# Patient Record
Sex: FEMALE
[De-identification: names, ages, dates, MRNs, and addresses within clinical notes are randomized per-mention and may not be internally consistent; named-entity substitution may affect disease eponyms.]

---

## 2020-04-24 ENCOUNTER — HOSPITAL ENCOUNTER (EMERGENCY)
Dept: HOSPITAL 56 - MW.ED | Age: 40
Discharge: HOME | End: 2020-04-24
Payer: MEDICAID

## 2020-04-24 DIAGNOSIS — Z79.899: ICD-10-CM

## 2020-04-24 DIAGNOSIS — K59.04: Primary | ICD-10-CM

## 2020-04-24 LAB
BUN SERPL-MCNC: 16 MG/DL (ref 7–18)
CHLORIDE SERPL-SCNC: 106 MMOL/L (ref 98–107)
CO2 SERPL-SCNC: 27.9 MMOL/L (ref 21–32)
GLUCOSE SERPL-MCNC: 89 MG/DL (ref 74–106)
LIPASE SERPL-CCNC: 124 U/L (ref 73–393)
POTASSIUM SERPL-SCNC: 3.9 MMOL/L (ref 3.5–5.1)
SODIUM SERPL-SCNC: 140 MMOL/L (ref 136–145)

## 2020-04-24 PROCEDURE — 83605 ASSAY OF LACTIC ACID: CPT

## 2020-04-24 PROCEDURE — 83690 ASSAY OF LIPASE: CPT

## 2020-04-24 PROCEDURE — 80053 COMPREHEN METABOLIC PANEL: CPT

## 2020-04-24 PROCEDURE — 96374 THER/PROPH/DIAG INJ IV PUSH: CPT

## 2020-04-24 PROCEDURE — 36415 COLL VENOUS BLD VENIPUNCTURE: CPT

## 2020-04-24 PROCEDURE — 85025 COMPLETE CBC W/AUTO DIFF WBC: CPT

## 2020-04-24 PROCEDURE — 83735 ASSAY OF MAGNESIUM: CPT

## 2020-04-24 PROCEDURE — 74177 CT ABD & PELVIS W/CONTRAST: CPT

## 2020-04-24 PROCEDURE — 96361 HYDRATE IV INFUSION ADD-ON: CPT

## 2020-04-24 PROCEDURE — 99285 EMERGENCY DEPT VISIT HI MDM: CPT

## 2020-04-24 NOTE — CT
CT abdomen and pelvis

 

Technique: Multiple axial sections were obtained from above the dome 

of the diaphragm inferiorly through the pubic symphysis.  Intravenous 

contrast was utilized.  No oral contrast has been given.

 

Comparison: No prior abdominal imaging.

 

Findings: Visualized lung bases show nothing acute.

 

Liver contains no focal parenchymal abnormality.  Spleen shows no 

focal abnormality.  Adrenal glands show no nodule.  Kidneys show 

symmetric contrast enhancement with no hydronephrosis or mass being 

seen.  Pancreas appears within normal limits.  Gallbladder contains no

 calcified gallstones.  Aorta shows no aneurysm.  No retroperitoneal 

adenopathy or mesenteric abnormalities are seen.  No pelvic mass or 

adenopathy is noted.

 

Increased stool is seen throughout the colon.

 

Appendix is seen which is normal in size.  No free fluid or 

inflammatory change is appreciated.

 

Bone window settings were reviewed.  No acute osseous finding is 

appreciated.

 

Impression:

1.  Increased stool throughout the colon.

2.  Nothing acute is otherwise seen on CT study of the abdomen and 

pelvis.

 

Diagnostic code #2

 

This report was dictated in MDT

## 2020-04-24 NOTE — EDM.PDOC
ED HPI GENERAL MEDICAL PROBLEM





- General


Chief Complaint: Abdominal Pain


Stated Complaint: ABD PAIN


Time Seen by Provider: 04/24/20 13:10


Source of Information: Reports: Patient


History Limitations: Reports: No Limitations





- History of Present Illness


INITIAL COMMENTS - FREE TEXT/NARRATIVE: 





This 39 year old female is admitted to the ED with a chief complaint of 

epigastric abdominal pain going through to the back for two days that has 

gotten worse.  She describes the pain as sharp to dull.  No nausea or vomiting.

  Complains of constipation.  No  complaints.  She denies any other 

complaints.





She has a history of pancreatitis that was diagnosed 4 years ago.  She is on 

Suboxone.  She denies any alcohol ingestion or drug use.


Onset: Gradual (over two days)


Duration: Constant


Location: Reports: Abdomen


Quality: Reports: Sharp (to dull)


  ** Upper Abdomen


Pain Score (Numeric/FACES): 10





- Related Data


 Allergies











Allergy/AdvReac Type Severity Reaction Status Date / Time


 


No Known Allergies Allergy   Verified 04/24/20 13:14











Home Meds: 


 Home Meds





Buprenorphine HCl/Naloxone HCl [Buprenorphin-Naloxon 8-2 mg Sl] 1 tab .XX DAILY 

04/24/20 [History]


Ondansetron [Zofran] 4 mg PO Q8H PRN 5 Days #15 tab 04/24/20 [Rx]


clonazePAM [Clonazepam] 1 mg PO ASDIRECTED 04/24/20 [History]











ED ROS GENERAL





- Review of Systems


Review Of Systems: See Below


Constitutional: Reports: No Symptoms


HEENT: Reports: No Symptoms


Respiratory: Reports: No Symptoms


Cardiovascular: Reports: No Symptoms


Endocrine: Reports: No Symptoms


GI/Abdominal: Reports: Abdominal Pain.  Denies: Nausea, Vomiting


: Reports: No Symptoms


Musculoskeletal: Reports: No Symptoms


Skin: Reports: No Symptoms


Neurological: Reports: No Symptoms





ED EXAM, GI/ABD





- Physical Exam


Exam: See Below


Exam Limited By: No Limitations


General Appearance: Alert, WD/WN, Moderate Distress (complaining of epigastric 

pain through to the back)


Eyes: Bilateral: Normal Appearance, EOMI


Ears: Normal External Exam, Normal Canal, Normal TMs


Nose: Normal Inspection, Normal Mucosa, No Blood


Throat/Mouth: Normal Inspection, Normal Oropharynx


Head: Atraumatic


Neck: Normal Inspection, Supple


Respiratory/Chest: No Respiratory Distress, Lungs Clear, Normal Breath Sounds, 

Chest Non-Tender


Cardiovascular: Normal Peripheral Pulses, Regular Rate, Rhythm, No Murmur


GI/Abdominal Exam: Normal Bowel Sounds, Soft, Guarding, Tender (epigastric 

tenderness through to the back).  No: Rigid, Rebound


 (Female) Exam: Deferred


Rectal (Female) Exam: Normal Exam, Normal Rectal Tone, Heme - Stool.  No: Fecal 

Impaction


Back Exam: Normal Inspection


Extremities: Normal Inspection


Neurological: Alert, Oriented (times 3), CN II-XII Intact, Normal Reflexes, No 

Motor/Sensory Deficits


Psychiatric: Normal Affect, Normal Mood


Skin Exam: Warm, Dry, Intact


Lymphatic: No Adenopathy





Course





- Vital Signs


Text/Narrative:: 





The patient was re-evaluated at 2:56PM.  She is much improved.  Rectal exam is 

negative for masses or fecal impaction.  Very small amount of stool noted in 

the rectal vault.  She will be discharged.  The patient agrees with the 

discharge plan.


Last Recorded V/S: 


 Last Vital Signs











Temp  96.7 F L  04/24/20 13:10


 


Pulse  68   04/24/20 14:12


 


Resp  17   04/24/20 14:12


 


BP  101/60   04/24/20 14:12


 


Pulse Ox  99   04/24/20 14:12














- Orders/Labs/Meds


Labs: 


 Laboratory Tests











  04/24/20 04/24/20 04/24/20 Range/Units





  13:09 13:09 13:09 


 


WBC  5.17    (4.0-11.0)  K/uL


 


RBC  4.34    (4.30-5.90)  M/uL


 


Hgb  11.8 L    (12.0-16.0)  g/dL


 


Hct  37.3    (36.0-46.0)  %


 


MCV  85.9    (80.0-98.0)  fL


 


MCH  27.2    (27.0-32.0)  pg


 


MCHC  31.6    (31.0-37.0)  g/dL


 


RDW Std Deviation  44.5    (28.0-62.0)  fl


 


RDW Coeff of Sommer  14    (11.0-15.0)  %


 


Plt Count  217    (150-400)  K/uL


 


MPV  9.40    (7.40-12.00)  fL


 


Neut % (Auto)  46.8 L    (48.0-80.0)  %


 


Lymph % (Auto)  41.6 H    (16.0-40.0)  %


 


Mono % (Auto)  8.9    (0.0-15.0)  %


 


Eos % (Auto)  2.5    (0.0-7.0)  %


 


Baso % (Auto)  0.2    (0.0-1.5)  %


 


Neut # (Auto)  2.4    (1.4-5.7)  K/uL


 


Lymph # (Auto)  2.2    (0.6-2.4)  K/uL


 


Mono # (Auto)  0.5    (0.0-0.8)  K/uL


 


Eos # (Auto)  0.1    (0.0-0.7)  K/uL


 


Baso # (Auto)  0.0    (0.0-0.1)  K/uL


 


Nucleated RBC %  0.0    /100WBC


 


Nucleated RBCs #  0    K/uL


 


Lactate   1.1   (0.20-2.00)  mmol/L


 


Sodium    140  (136-145)  mmol/L


 


Potassium    3.9  (3.5-5.1)  mmol/L


 


Chloride    106  ()  mmol/L


 


Carbon Dioxide    27.9  (21.0-32.0)  mmol/L


 


BUN    16  (7.0-18.0)  mg/dL


 


Creatinine    0.9  (0.6-1.0)  mg/dL


 


Est Cr Clr Drug Dosing    TNP  


 


Estimated GFR (MDRD)    > 60.0  ml/min


 


Glucose    89  ()  mg/dL


 


Calcium    8.3 L  (8.5-10.1)  mg/dL


 


Magnesium    2.1  (1.8-2.4)  mg/dL


 


Total Bilirubin    0.1 L  (0.2-1.0)  mg/dL


 


AST    28  (15-37)  IU/L


 


ALT    32  (14-63)  IU/L


 


Alkaline Phosphatase    50  ()  U/L


 


Total Protein    6.7  (6.4-8.2)  g/dL


 


Albumin    3.2 L  (3.4-5.0)  g/dL


 


Globulin    3.5  (2.6-4.0)  g/dL


 


Albumin/Globulin Ratio    0.9  (0.9-1.6)  


 


Lipase    124  ()  U/L











Meds: 


Medications














Discontinued Medications














Generic Name Dose Route Start Last Admin





  Trade Name Freq  PRN Reason Stop Dose Admin


 


Sodium Chloride  1,000 mls @ 1,000 mls/hr  04/24/20 13:13  04/24/20 13:20





  Normal Saline  IV  04/24/20 14:12  1,000 mls/hr





  .Bolus ONE   Administration





     





     





     





     


 


Iopamidol  90 ml  04/24/20 14:09  04/24/20 14:11





  Isovue Multipack-370 (76%)  IVPUSH  04/24/20 14:10  90 ml





  ONETIME STA   Administration





     





     





     





     


 


Morphine Sulfate  4 mg  04/24/20 13:16  04/24/20 13:20





  Morphine  IVPUSH  04/24/20 13:17  4 mg





  ONETIME ONE   Administration





     





     





     





     














Departure





- Departure


Time of Disposition: 15:02


Disposition: Home, Self-Care 01


Condition: Good


Clinical Impression: 


 Nonspecific abdominal pain





Constipation


Qualifiers:


 Constipation type: chronic idiopathic constipation Qualified Code(s): K59.04 - 

Chronic idiopathic constipation








- Discharge Information


*PRESCRIPTION DRUG MONITORING PROGRAM REVIEWED*: Yes


*COPY OF PRESCRIPTION DRUG MONITORING REPORT IN PATIENT TEA: Yes


Instructions:  Constipation, Adult, Easy-to-Read, Probiotics, High-Fiber Diet


Forms:  ED Department Discharge


Additional Instructions: 


Take all medications as directed.  Follow up with your PCP in the next two to 

four days.  Clear liquids for the next 8 hours and then advance your diet as 

tolerated.  Rest for the next 24 hours.  Return to the ED if your condition 

gets worse or should you have any questions or concerns.





The following information is given to patients seen in the emergency department 

who are being discharged to home. This information is to outline your options 

for follow-up care. We provide all patients seen in our emergency department 

with a follow-up referral.





The need for follow-up, as well as the timing and circumstances, are variable 

depending upon the specifics of your emergency department visit.





If you don't have a primary care physician on staff, we will provide you with a 

referral. We always advise you to contact your personal physician following an 

emergency department visit to inform them of the circumstance of the visit and 

for follow-up with them and/or the need for any referrals to a consulting 

specialist.





The emergency department will also refer you to a specialist when appropriate. 

This referral assures that you have the opportunity for follow-up care with a 

specialist. All of these measure are taken in an effort to provide you with 

optimal care, which includes your follow-up.





Under all circumstances we always encourage you to contact your private 

physician who remains a resource for coordinating your care. When calling for 

follow-up care, please make the office aware that this follow-up is from your 

recent emergency room visit. If for any reason you are refused follow-up, 

please contact the [facility] Emergency 

Department at [phone] and asked to speak to the emergency department 

charge nurse.





Sepsis Event Note





- Focused Exam


Vital Signs: 


 Vital Signs











  Temp Pulse Resp BP Pulse Ox


 


 04/24/20 14:12   68  17  101/60  99


 


 04/24/20 13:23   99  17  111/63  98


 


 04/24/20 13:10  96.7 F L  71  16  115/54 L  97











Date Exam was Performed: 04/24/20


Time Exam was Performed: 14:59

## 2021-05-20 NOTE — US
INDICATION:



Right upper quadrant pain



TECHNIQUE:



Ultrasound abdomen limited.  Sonographic images of the right upper quadrant 

were obtained using gray-scale and color Doppler images.



COMPARISON:



None available



FINDINGS:



Liver: Unremarkable size and echotexture.  No masses.  No intrahepatic 

biliary dilatation.  



Gallbladder: Cholelithiasis. No significant gallbladder wall thickening. A 

reported negative sonographic Shen`s sign. 



Common bile duct: 6 mm.  



Pancreas: Incompletely imaged due to obscuring bowel gas. 



Right kidney: 10.5 x 4.1 x 5.0 cm.  Normal echotexture and cortex.  No 

masses, stones, or hydronephrosis.  



IMPRESSION:



Cholelithiasis. 



Upper limits of normal CBD caliber. 



Limited evaluation of the pancreas.



Dictated by Kumar Ramos MD @ 5/20/2021 4:22:44 AM



Signed by Dr. Kumar Ramos @ May 20 2021  4:22AM

## 2021-05-20 NOTE — EDM.PDOC
ED HPI GENERAL MEDICAL PROBLEM





- General


Chief Complaint: Abdominal Pain


Stated Complaint: ABDOMINAL PAIN


Time Seen by Provider: 21 02:52





- History of Present Illness


INITIAL COMMENTS - FREE TEXT/NARRATIVE: 





HISTORY AND PHYSICAL:





History of present illness:


41-year-old female with no significant past medical history who presents ER 

today complaining of severe right upper quadrant abdominal pain that started 

earlier today.  Patient denies any fevers, shakes, chills, nausea, vomiting, 

diarrhea, dysuria, frequency, urgency.  Patient reports she was able to eat 

dinner several hours prior to the pain starting.  Patient reports similar 

episodes in the past x1.  Patient received Toradol 30 mg IV by EMS with minimal 

relief in her discomfort.





Review of systems: 


As per history of present illness and below otherwise all systems reviewed and 

negative.





Past medical history: 


As per history of present illness and as reviewed below otherwise 

noncontributory.





Surgical history: 


As per history of present illness and as reviewed below otherwise 

noncontributory.





Social history: 


No reported history of drug abuse.





Family history: 


As per history of present illness and as reviewed below otherwise 

noncontributory.





Physical exam:





This patient was seen and evaluated during the  SARS-CoV-2 novel coronavirus

pandemic period.  Community viral transmission is ongoing at time of this 

encounter and the emergency department is operating under pandemic response 

procedures.





Constitutional: Patient is oriented to person, place, and time.  Appears well-

developed and well-nourished.  No distress.


 HEENT: Moist mucous membranes


 Head: Normocephalic and atraumatic


 Eyes: Right eye exhibits no discharge.  Left eye exhibits no discharge.  No 

scleral icterus


 Neck: Normal range of motion.  No tracheal deviation present.


 Cardiovascular: Normal rate and regular rhythm.


 Pulmonary: Effort normal, no respiratory distress.


Abd:  Soft, nondistended, no rebound/guarding, no psoas or obturator signs, no 

tenderness at Mcberney's point, no Shen's sign.  Pt does not present with an 

exam that would be consistent with an acute surgical abdomen at this time, 

tenderness palpation right upper quadrant.


 Musculoskeletal: Normal range of motion


 Neurologic: Alert and oriented to person, place and time.


 Skin: Pink, warm and dry.  


 Psychiatric: Normal mood and affect.  Behavior is normal.  Judgment and thought

content normal.


 Nursing note and vital signs have been reviewed








Diagnostics:


Ultrasound of right upper quadrant reveals cholelithiasis with no significant 

gallbladder wall thickening.  A negative sonographic Shen's.  No ductal or 

intrahepatic biliary dilatation.





Therapeutics:


Fentanyl 50 mcg IV x1


Zofran 4 mg IV


NSS x1 L





Assessment and plan:


41-year-old female who presents to the ER today complaining of right upper 

quadrant abdominal pain.  Patient's ultrasound is consistent with biliary colic.

 Patient has been reevaluated by me and her pain is completely resolved at this 

time.  Patient be given the phone number for surgery for outpatient follow-up.  

Patient has no flank tenderness, no hematuria, no suprapubic abdominal 

discomfort.  No signs or symptoms consistent with a urinary tract infection.





Patient will be discharged home with a prescription for Zofran and Bentyl.  

Reassessment at the time of disposition demonstrates that the patient is in no 

acute distress.  The patient has remained stable throughout the entire ED visit 

and is without objective evidence for acute process requiring urgent 

intervention or hospitalization. The patient is stable for discharge, counseling

is provided as documented above, discussed symptomatic treatment and specific 

conditions for return.





I have spoken with the patient/caregiver and discussed todays findings, in 

addition to providing specific details for the plan of care. Questions are 

answered and there is agreement with the plan.











Definitive disposition and diagnosis as appropriate pending reevaluation and 

review of above.








  ** Abdomen


Pain Score (Numeric/FACES): 9





- Related Data


                                    Allergies











Allergy/AdvReac Type Severity Reaction Status Date / Time


 


No Known Allergies Allergy   Verified 21 02:51











Home Meds: 


                                    Home Meds





Buprenorphine HCl/Naloxone HCl [Buprenorphine-Nalox 8-2 mg Tab] 1 tab PO 

ASDIRECTED 20 [History]


clonazePAM [Clonazepam] 1 mg PO DAILY 20 [History]


Dicyclomine [Bentyl] 20 mg PO QIDACANDBED PRN #20 tab 21 [Rx]


Ibuprofen 600 mg PO Q6HR PRN #30 tablet 21 [Rx]


Phendimetrazine Tartrate 35 mg PO DAILY 21 [History]


estradioL [Estradiol] 1 tab PO DAILY 21 [History]











Past Medical History


HEENT History: Reports: None


Cardiovascular History: Reports: None


Respiratory History: Reports: None


Gastrointestinal History: Reports: Pancreatitis


Genitourinary History: Reports: None


OB/GYN History: Reports: None


Musculoskeletal History: Reports: None


Neurological History: Reports: None


Psychiatric History: Reports: Anxiety, Depression


Endocrine/Metabolic History: Reports: None


Insulin Pump Model and : None


Hematologic History: Reports: None


Immunologic History: Reports: None


Oncologic (Cancer) History: Reports: None


Dermatologic History: Reports: None





- Infectious Disease History


Infectious Disease History: Reports: None





- Past Surgical History


Head Surgeries/Procedures: Reports: None


HEENT Surgical History: Reports: Naso-Sinus Surgery


Female  Surgical History: Reports:  Section, Hysterectomy





Social & Family History





- Recreational Drug Use


Recreational Drug Use: Yes


Drug Use in Last 12 Months: Yes


Recreational Drug Type: Reports: Marijuana/Hashish





ED ROS GENERAL





- Review of Systems


Review Of Systems: See Below





ED EXAM, GENERAL





- Physical Exam


Exam: See Below





Course





- Vital Signs


Last Recorded V/S: 





                                Last Vital Signs











Temp  96.8 F L  21 02:40


 


Pulse  55 L  21 03:45


 


Resp  16   21 03:45


 


BP  100/49 L  21 03:45


 


Pulse Ox  97   21 03:45














- Orders/Labs/Meds


Orders: 





                               Active Orders 24 hr











 Category Date Time Status


 


 HCG QUALITATIVE,URINE [URCHEM] Stat Lab  21 02:52 Ordered


 


 UA W/TAMRA RFLX IF INDICATED [URIN] Stat Lab  21 02:53 Ordered


 


 Sodium Chloride 0.9% [Saline Flush] Med  21 02:52 Active





 10 ml FLUSH ASDIRECTED PRN   


 


 Sodium Chloride 0.9% [Saline Flush] Med  21 02:52 Active





 2.5 ml FLUSH ASDIRECTED PRN   


 


 Saline Lock Insert [OM.PC] Stat Oth  21 02:52 Ordered








                                Medication Orders





Sodium Chloride (Sodium Chloride 0.9% 10 Ml Syringe)  10 ml FLUSH ASDIRECTED PRN


   PRN Reason: Keep Vein Open


Sodium Chloride (Sodium Chloride 0.9% 2.5 Ml Syringe)  2.5 ml FLUSH ASDIRECTED 

PRN


   PRN Reason: Keep Vein Open








Labs: 





                                Laboratory Tests











  21 Range/Units





  02:40 02:40 


 


WBC  7.27   (4.0-11.0)  K/uL


 


RBC  4.37   (4.30-5.90)  M/uL


 


Hgb  12.6   (12.0-16.0)  g/dL


 


Hct  37.3   (36.0-46.0)  %


 


MCV  85.4   (80.0-98.0)  fL


 


MCH  28.8   (27.0-32.0)  pg


 


MCHC  33.8   (31.0-37.0)  g/dL


 


RDW Std Deviation  41.5   (28.0-62.0)  fl


 


RDW Coeff of Sommer  13   (11.0-15.0)  %


 


Plt Count  258   (150-400)  K/uL


 


MPV  9.80   (7.40-12.00)  fL


 


Neut % (Auto)  64.5   (48.0-80.0)  %


 


Lymph % (Auto)  26.4   (16.0-40.0)  %


 


Mono % (Auto)  7.8   (0.0-15.0)  %


 


Eos % (Auto)  1.2   (0.0-7.0)  %


 


Baso % (Auto)  0.1   (0.0-1.5)  %


 


Neut # (Auto)  4.7   (1.4-5.7)  K/uL


 


Lymph # (Auto)  1.9   (0.6-2.4)  K/uL


 


Mono # (Auto)  0.6   (0.0-0.8)  K/uL


 


Eos # (Auto)  0.1   (0.0-0.7)  K/uL


 


Baso # (Auto)  0.0   (0.0-0.1)  K/uL


 


Nucleated RBC %  0.0   /100WBC


 


Nucleated RBCs #  0   K/uL


 


Sodium   137  (136-145)  mmol/L


 


Potassium   4.0  (3.5-5.1)  mmol/L


 


Chloride   102  ()  mmol/L


 


Carbon Dioxide   28.2  (21.0-32.0)  mmol/L


 


BUN   16  (7.0-18.0)  mg/dL


 


Creatinine   1.0  (0.6-1.0)  mg/dL


 


Est Cr Clr Drug Dosing   TNP  


 


Estimated GFR (MDRD)   > 60.0  ml/min


 


Glucose   102  ()  mg/dL


 


Calcium   7.9 L  (8.5-10.1)  mg/dL


 


Total Bilirubin   0.6  (0.2-1.0)  mg/dL


 


AST   132 H  (15-37)  IU/L


 


ALT   120 H  (14-63)  IU/L


 


Alkaline Phosphatase   72  ()  U/L


 


Total Protein   7.3  (6.4-8.2)  g/dL


 


Albumin   3.5  (3.4-5.0)  g/dL


 


Globulin   3.8  (2.6-4.0)  g/dL


 


Albumin/Globulin Ratio   0.9  (0.9-1.6)  


 


Lipase   138  ()  U/L











Meds: 





Medications











Generic Name Dose Route Start Last Admin





  Trade Name Freq  PRN Reason Stop Dose Admin


 


Sodium Chloride  10 ml  21 02:52 





  Sodium Chloride 0.9% 10 Ml Syringe  FLUSH  





  ASDIRECTED PRN  





  Keep Vein Open  


 


Sodium Chloride  2.5 ml  21 02:52 





  Sodium Chloride 0.9% 2.5 Ml Syringe  FLUSH  





  ASDIRECTED PRN  





  Keep Vein Open  














Discontinued Medications














Generic Name Dose Route Start Last Admin





  Trade Name Freq  PRN Reason Stop Dose Admin


 


Fentanyl  50 mcg  21 02:55  21 03:08





  Fentanyl 50 Mcg/Ml Sdv  IVPUSH  21 02:56  50 mcg





  ONETIME ONE   Administration


 


Sodium Chloride  1,000 mls @ 999 mls/hr  21 02:52  21 03:07





  Normal Saline  IV  21 03:52  999 mls/hr





  .Bolus ONE   Administration


 


Ketorolac Tromethamine  30 mg  21 02:52  21 03:01





  Ketorolac 30 Mg/Ml Sdv  IVPUSH  21 02:53  Not Given





  ONETIME ONE  


 


Ondansetron HCl  4 mg  21 02:52  21 03:08





  Ondansetron 4 Mg/2 Ml Sdv  IVPUSH  21 02:53  4 mg





  ONETIME ONE   Administration














Departure





- Departure


Time of Disposition: 05:55


Disposition: Home, Self-Care 01


Condition: Good


Clinical Impression: 


 Biliary colic





Abdominal pain


Qualifiers:


 Abdominal location: right upper quadrant Qualified Code(s): R10.11 - Right 

upper quadrant pain








- Discharge Information


Instructions:  Biliary Colic, Adult


Additional Instructions: 


You were seen and evaluated in the ER today secondary to pain in your right 

upper quadrant.  The ultrasound that we obtained revealed that you have gal

lstones within your gallbladder.  Your blood tests were all within normal 

limits.  You did not appear to have any inflammation within the liver itself.  

You will be given the phone number for the surgery clinic to follow-up with them

 as an outpatient to evaluate you for possible elective removal of your 

gallbladder.  You will be given a prescription for Bentyl which is an 

antispasmodic medication as well as ibuprofen to help with your pain and 

discomfort if it should come back.  Please avoid greasy fatty foods as this will

 irritate your gallbladder.





[facility] - General Surgery


 Professional Building


[address] [place] [address], [address]


[place] [address]


Phone: [phone]








The following information is given to patients seen in the emergency department 

who are being discharged to home. This information is to outline your options 

for follow-up care. We provide all patients seen in our emergency department 

with a follow-up referral.





The need for follow-up, as well as the timing and circumstances, are variable 

depending upon the specifics of your emergency department visit.





If you don't have a primary care physician on staff, we will provide you with a 

referral. We always advise you to contact your personal physician following an 

emergency department visit to inform them of the circumstance of the visit and 

for follow-up with them and/or the need for any referrals to a consulting 

specialist.





The emergency department will also refer you to a specialist when appropriate. 

This referral assures that you have the opportunity for follow-up care with a 

specialist. All of these measure are taken in an effort to provide you with 

optimal care, which includes your follow-up.





Under all circumstances we always encourage you to contact your private 

physician who remains a resource for coordinating your care. When calling for 

follow-up care, please make the office aware that this follow-up is from your 

recent emergency room visit. If for any reason you are refused follow-up, please

 contact the [facility] Emergency 

Department at [phone] and asked to speak to the emergency department 

charge nurse.





[facility] - Primary Care


[address] [place] [id number]


Phone: [phone]


Fax: [phone]








[address] [address]


Phone: [phone]


Fax: [phone]








Sepsis Event Note (ED)





- Evaluation


Sepsis Screening Result: No Definite Risk





- Focused Exam


Vital Signs: 





                                   Vital Signs











  Temp Pulse Resp BP Pulse Ox


 


 21 03:45   55 L  16  100/49 L  97


 


 21 02:40  96.8 F L  64  18  98/43 L  97














- My Orders


Last 24 Hours: 





My Active Orders





21 02:52


HCG QUALITATIVE,URINE [URCHEM] Stat 


Sodium Chloride 0.9% [Saline Flush]   10 ml FLUSH ASDIRECTED PRN 


Sodium Chloride 0.9% [Saline Flush]   2.5 ml FLUSH ASDIRECTED PRN 


Saline Lock Insert [OM.PC] Stat 





21 02:53


UA W/TAMRA RFLX IF INDICATED [URIN] Stat 














- Assessment/Plan


Last 24 Hours: 





My Active Orders





21 02:52


HCG QUALITATIVE,URINE [URCHEM] Stat 


Sodium Chloride 0.9% [Saline Flush]   10 ml FLUSH ASDIRECTED PRN 


Sodium Chloride 0.9% [Saline Flush]   2.5 ml FLUSH ASDIRECTED PRN 


Saline Lock Insert [OM.PC] Stat 





21 02:53


UA W/TAMRA RFLX IF INDICATED [URIN] Stat